# Patient Record
Sex: FEMALE | URBAN - METROPOLITAN AREA
[De-identification: names, ages, dates, MRNs, and addresses within clinical notes are randomized per-mention and may not be internally consistent; named-entity substitution may affect disease eponyms.]

---

## 2023-06-21 ENCOUNTER — HOME HEALTH ADMISSION (OUTPATIENT)
Dept: HOME HEALTH SERVICES | Facility: HOME HEALTH | Age: 68
End: 2023-06-21
Payer: MEDICARE

## 2023-06-22 ENCOUNTER — HOME CARE VISIT (OUTPATIENT)
Dept: SCHEDULING | Facility: HOME HEALTH | Age: 68
End: 2023-06-22

## 2023-06-22 PROCEDURE — 0221000100 HH NO PAY CLAIM PROCEDURE

## 2023-06-22 PROCEDURE — G0299 HHS/HOSPICE OF RN EA 15 MIN: HCPCS

## 2023-06-23 ENCOUNTER — HOME CARE VISIT (OUTPATIENT)
Dept: SCHEDULING | Facility: HOME HEALTH | Age: 68
End: 2023-06-23

## 2023-06-23 VITALS — TEMPERATURE: 98.1 F | RESPIRATION RATE: 18 BRPM | OXYGEN SATURATION: 93 % | HEART RATE: 89 BPM

## 2023-06-23 PROCEDURE — G0152 HHCP-SERV OF OT,EA 15 MIN: HCPCS

## 2023-06-23 ASSESSMENT — ENCOUNTER SYMPTOMS: DYSPNEA ACTIVITY LEVEL: AFTER AMBULATING 10 - 20 FT

## 2023-06-23 NOTE — HOME HEALTH
SN obtained verbal telephone consent from HCS/brother Freddie Dejesus prior to SN arrival. Start of care packet is completed and will be sent to [facility] for signature. SN greeted patient in [facility] where she resides with caregivers present. Patient is alert and in no apparent distress upon arrival, sitting on the patio smoking a cigarette. Caregiver notified SN that patient is \"very noncompliant. \" SN is unable to determine patient's orientation to place, time, situation because patient refused to answer assessment questions throughout visit. Patient is able to speak, but when asked assessment questions, she stared at [facility] and would not answer even when others encouraged her to do so. Patient has PMH of schizophrenia, MDD, anxiety, cva. Patient allowed SN to obtain vitals except for blood pressure. Patient said \"no\" and does not explain why she doesn't want her blood pressure assessed. Patient has no known skin breakdown. Medications reviewed with [facility] staff. Caregiver states patient arrived to this [facility] yesterday 6/21/23 and the [place] , Dr. Gwendolyn Jefferson has not seen patient yet. [facility] states they will be requesting a antipsychotic medication change or addition. Patient is homebound due to mental health disorder, weakness, impaired functional mobility, risk for fall,  patient requires use of assistive device and patient requires assistance of another person to leave home safely. SN educated caregiver on disease process and management, home safety, using assistive device at all times, fall precautions, s/s of infection, medication safety, high risk medications aspirin and fluPHENAZine decanoate, s/s to lookout for and when/how to intervene if necessary, call me first procedure, s/s to report to nurse, physician, and s/s that necessitate calling emergency personnel. Caregiver repeated back and verbalized understanding, all questions answered.  SN ended visit with pt alert and in no apparent distress and caregiver agreeable to plan

## 2023-06-24 ENCOUNTER — HOME CARE VISIT (OUTPATIENT)
Dept: HOME HEALTH SERVICES | Facility: HOME HEALTH | Age: 68
End: 2023-06-24

## 2023-06-24 PROCEDURE — G0151 HHCP-SERV OF PT,EA 15 MIN: HCPCS

## 2023-06-25 VITALS
TEMPERATURE: 98.1 F | SYSTOLIC BLOOD PRESSURE: 131 MMHG | RESPIRATION RATE: 18 BRPM | HEART RATE: 64 BPM | DIASTOLIC BLOOD PRESSURE: 81 MMHG | OXYGEN SATURATION: 96 %

## 2023-06-25 ASSESSMENT — ENCOUNTER SYMPTOMS: HEMOPTYSIS: 0

## 2023-06-27 ENCOUNTER — HOME CARE VISIT (OUTPATIENT)
Dept: HOME HEALTH SERVICES | Facility: HOME HEALTH | Age: 68
End: 2023-06-27

## 2023-06-27 PROCEDURE — G0152 HHCP-SERV OF OT,EA 15 MIN: HCPCS

## 2023-06-28 ENCOUNTER — HOME CARE VISIT (OUTPATIENT)
Dept: SCHEDULING | Facility: HOME HEALTH | Age: 68
End: 2023-06-28

## 2023-06-28 PROCEDURE — G0299 HHS/HOSPICE OF RN EA 15 MIN: HCPCS

## 2023-06-29 ENCOUNTER — HOME CARE VISIT (OUTPATIENT)
Dept: SCHEDULING | Facility: HOME HEALTH | Age: 68
End: 2023-06-29

## 2023-06-29 ENCOUNTER — HOME CARE VISIT (OUTPATIENT)
Dept: HOME HEALTH SERVICES | Facility: HOME HEALTH | Age: 68
End: 2023-06-29
Payer: MEDICARE

## 2023-06-29 VITALS
OXYGEN SATURATION: 98 % | HEART RATE: 67 BPM | RESPIRATION RATE: 18 BRPM | RESPIRATION RATE: 18 BRPM | DIASTOLIC BLOOD PRESSURE: 82 MMHG | TEMPERATURE: 97.3 F | SYSTOLIC BLOOD PRESSURE: 124 MMHG | TEMPERATURE: 98.3 F | HEART RATE: 76 BPM | SYSTOLIC BLOOD PRESSURE: 128 MMHG | OXYGEN SATURATION: 98 % | DIASTOLIC BLOOD PRESSURE: 83 MMHG

## 2023-06-29 PROCEDURE — G0152 HHCP-SERV OF OT,EA 15 MIN: HCPCS

## 2023-06-29 ASSESSMENT — ENCOUNTER SYMPTOMS
HEMOPTYSIS: 0
HEMOPTYSIS: 0

## 2023-06-30 ENCOUNTER — HOME CARE VISIT (OUTPATIENT)
Dept: HOME HEALTH SERVICES | Facility: HOME HEALTH | Age: 68
End: 2023-06-30
Payer: MEDICARE

## 2023-06-30 VITALS
RESPIRATION RATE: 18 BRPM | TEMPERATURE: 98.5 F | SYSTOLIC BLOOD PRESSURE: 115 MMHG | DIASTOLIC BLOOD PRESSURE: 59 MMHG | HEART RATE: 77 BPM

## 2023-06-30 ASSESSMENT — ENCOUNTER SYMPTOMS: DYSPNEA ACTIVITY LEVEL: AFTER AMBULATING MORE THAN 20 FT

## 2023-07-04 ENCOUNTER — HOME CARE VISIT (OUTPATIENT)
Dept: HOME HEALTH SERVICES | Facility: HOME HEALTH | Age: 68
End: 2023-07-04

## 2023-07-04 VITALS
HEART RATE: 70 BPM | SYSTOLIC BLOOD PRESSURE: 120 MMHG | RESPIRATION RATE: 18 BRPM | OXYGEN SATURATION: 97 % | DIASTOLIC BLOOD PRESSURE: 61 MMHG | TEMPERATURE: 98.2 F

## 2023-07-04 PROCEDURE — G0152 HHCP-SERV OF OT,EA 15 MIN: HCPCS

## 2023-07-04 ASSESSMENT — ENCOUNTER SYMPTOMS: HEMOPTYSIS: 0

## 2023-07-05 NOTE — HOME HEALTH
Pt tolerated 4 sets of 20 balloon taps outside of BUTCH in all planes with right reactions present. She was unable to complete UB therapband exercises with demo and said she didnt want to do that. Ambulated throughout the facility with 4ww with one rest break due to fatigue. She sat with good neutral posture and started coloring 4th of july worksheet wirh no assist and no outbursts. cont pt POC.

## 2023-07-06 ENCOUNTER — HOME CARE VISIT (OUTPATIENT)
Dept: SCHEDULING | Facility: HOME HEALTH | Age: 68
End: 2023-07-06

## 2023-07-06 PROCEDURE — G0152 HHCP-SERV OF OT,EA 15 MIN: HCPCS

## 2023-07-07 ENCOUNTER — HOME CARE VISIT (OUTPATIENT)
Dept: SCHEDULING | Facility: HOME HEALTH | Age: 68
End: 2023-07-07

## 2023-07-07 VITALS
OXYGEN SATURATION: 97 % | SYSTOLIC BLOOD PRESSURE: 128 MMHG | TEMPERATURE: 98.5 F | DIASTOLIC BLOOD PRESSURE: 80 MMHG | RESPIRATION RATE: 18 BRPM | HEART RATE: 67 BPM

## 2023-07-07 PROCEDURE — G0299 HHS/HOSPICE OF RN EA 15 MIN: HCPCS

## 2023-07-07 ASSESSMENT — ENCOUNTER SYMPTOMS: HEMOPTYSIS: 0

## 2023-07-07 NOTE — HOME HEALTH
Pt completed 5 sets of 20 reps of dyn sit balance activity at edge of chair. She stated that she was tired and wanted to be finished at that point. Therapist offered word search sheets and set pt up at activity table and explained to staff that when she gets upset or behavior outbursts it has helped to offer coloring and/or simple large print pord searches. cont pt POC.

## 2023-07-07 NOTE — HOME HEALTH
Patient DC from [facility] AT [facility] services with all goals met. SN provided education on  s/s of infection, anxiety, depression, post op complications, HTN, and medication management. Patient verbalized understanding of education provided and when to call MD. PT/OT provided therapy, patient tolerated well.

## 2023-07-08 ENCOUNTER — HOME CARE VISIT (OUTPATIENT)
Dept: HOME HEALTH SERVICES | Facility: HOME HEALTH | Age: 68
End: 2023-07-08
Payer: MEDICARE

## 2023-07-11 ENCOUNTER — HOME CARE VISIT (OUTPATIENT)
Dept: HOME HEALTH SERVICES | Facility: HOME HEALTH | Age: 68
End: 2023-07-11
Payer: MEDICARE

## 2023-07-11 PROCEDURE — G0152 HHCP-SERV OF OT,EA 15 MIN: HCPCS

## 2023-07-12 VITALS
RESPIRATION RATE: 18 BRPM | SYSTOLIC BLOOD PRESSURE: 132 MMHG | DIASTOLIC BLOOD PRESSURE: 89 MMHG | OXYGEN SATURATION: 97 % | HEART RATE: 78 BPM | TEMPERATURE: 98.3 F

## 2023-07-14 ENCOUNTER — HOME CARE VISIT (OUTPATIENT)
Dept: HOME HEALTH SERVICES | Facility: HOME HEALTH | Age: 68
End: 2023-07-14
Payer: MEDICARE

## 2023-07-14 PROCEDURE — G0152 HHCP-SERV OF OT,EA 15 MIN: HCPCS

## 2023-07-18 ASSESSMENT — ENCOUNTER SYMPTOMS: HEMOPTYSIS: 0

## 2023-07-19 VITALS
HEART RATE: 67 BPM | RESPIRATION RATE: 18 BRPM | TEMPERATURE: 98.1 F | OXYGEN SATURATION: 97 % | SYSTOLIC BLOOD PRESSURE: 132 MMHG | DIASTOLIC BLOOD PRESSURE: 89 MMHG

## 2023-07-19 ASSESSMENT — ENCOUNTER SYMPTOMS: HEMOPTYSIS: 0

## 2023-07-19 NOTE — HOME HEALTH
Pt participated in skilled OT services including ther ex, balance, ADL, and transfer training. Pt has made fair progress toward OT stated goals. Pt is able to complete ADLs and functional transfers with CGA. She requires continuous supervision secondary to safety concerns. Pt agrees with DC from OT services at this time.

## 2023-07-19 NOTE — HOME HEALTH
Pt completed BUE HEP AROM in all planes 3x10 reps with no c/o pain. She was able to sit at edge of chair unsupported and complete balloon taps in all planes 3 sets of 20. Pt c/o fatigue and requested a coloring sheet which she displayed good upright posture and functional position to complete. cont pt POC.

## 2023-08-04 NOTE — HOME HEALTH
Patient was originally agreeable to skilled therapy but is only seeing OT at this time. Will re-eval if the pt requests. Pt not formally seen for DC therefore this is non-billable.